# Patient Record
Sex: FEMALE | Race: WHITE | NOT HISPANIC OR LATINO | URBAN - METROPOLITAN AREA
[De-identification: names, ages, dates, MRNs, and addresses within clinical notes are randomized per-mention and may not be internally consistent; named-entity substitution may affect disease eponyms.]

---

## 2021-05-11 PROBLEM — Z00.00 ENCOUNTER FOR PREVENTIVE HEALTH EXAMINATION: Status: ACTIVE | Noted: 2021-05-11

## 2021-05-17 ENCOUNTER — EMERGENCY (EMERGENCY)
Facility: HOSPITAL | Age: 36
LOS: 1 days | Discharge: HOME | End: 2021-05-17
Attending: STUDENT IN AN ORGANIZED HEALTH CARE EDUCATION/TRAINING PROGRAM
Payer: MEDICARE

## 2021-05-17 VITALS
HEART RATE: 69 BPM | OXYGEN SATURATION: 99 % | RESPIRATION RATE: 18 BRPM | TEMPERATURE: 99 F | SYSTOLIC BLOOD PRESSURE: 126 MMHG | DIASTOLIC BLOOD PRESSURE: 82 MMHG

## 2021-05-17 VITALS
DIASTOLIC BLOOD PRESSURE: 71 MMHG | HEART RATE: 79 BPM | OXYGEN SATURATION: 98 % | RESPIRATION RATE: 16 BRPM | TEMPERATURE: 99 F | SYSTOLIC BLOOD PRESSURE: 121 MMHG

## 2021-05-17 DIAGNOSIS — F32.0 MAJOR DEPRESSIVE DISORDER, SINGLE EPISODE, MILD: ICD-10-CM

## 2021-05-17 DIAGNOSIS — F43.10 POST-TRAUMATIC STRESS DISORDER, UNSPECIFIED: ICD-10-CM

## 2021-05-17 DIAGNOSIS — F32.89 OTHER SPECIFIED DEPRESSIVE EPISODES: ICD-10-CM

## 2021-05-17 LAB — SARS-COV-2 RNA SPEC QL NAA+PROBE: SIGNIFICANT CHANGE UP

## 2021-05-17 PROCEDURE — U0003: CPT

## 2021-05-17 PROCEDURE — 99284 EMERGENCY DEPT VISIT MOD MDM: CPT

## 2021-05-17 PROCEDURE — U0005: CPT

## 2021-05-17 PROCEDURE — 99283 EMERGENCY DEPT VISIT LOW MDM: CPT

## 2021-05-17 RX ORDER — LEVOTHYROXINE SODIUM 125 MCG
1 TABLET ORAL
Qty: 30 | Refills: 0
Start: 2021-05-17 | End: 2021-06-15
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
## 2021-05-17 NOTE — ED BEHAVIORAL HEALTH ASSESSMENT NOTE - SUMMARY
37 y/o F, PPHX of PTSD and clinical depression w/PMHx hypothyroidism called EMS on herself and states she just wanted a COVID test. Pt was not compliant with interview and was very irritable with any questions. Pt had very disorganized thoughts and various tangents about social security, paperwork, her parents living in [place], and hx of mental health. When asked further questions about each she states she doesn't feel like she needs to talk about it to me. Pt states she is set up for housing. No psychiatric care indicated.

## 2021-05-17 NOTE — ED ADULT NURSE REASSESSMENT NOTE - NS ED NURSE REASSESS COMMENT FT1
Pt discharged by MD Bello. pt seen by Social work, given bus tickets and list of motels and told to go to [facility] in AM. Pt verbally abusive to staff. Security called to assist.

## 2021-05-17 NOTE — ED BEHAVIORAL HEALTH ASSESSMENT NOTE - CASE SUMMARY
37 y/o F, PPHX of PTSD and clinical depression w/PMHx hypothyroidism called EMS on herself and states she just wanted a COVID test. Pt was not compliant with interview and was very irritable with any questions. Pt had very disorganized thoughts and various tangents about social security, paperwork, her parents living in [place], and hx of mental health. When asked further questions about each she states she doesn't feel like she needs to talk about it to me. Pt states she is set up for housing. No psychiatric care indicated. 35 y/o F, PPHX of PTSD and clinical depression w/PMHx hypothyroidism called EMS on herself and states she just wanted a COVID test. Pt was not compliant with interview and was very irritable with any questions. Pt had very disorganized thoughts and various tangents about social security, paperwork, her parents living in [place], and hx of mental health. When asked further questions about each she states she doesn't feel like she needs to talk about it to me. Pt would like to be set up with Emergency Housing . No psychiatric care indicated. Pt was seen with Dr. Hawley and did not portray that she is a threat to herself or anyone else.

## 2021-05-17 NOTE — ED PROVIDER NOTE - PATIENT PORTAL LINK FT
You can access the FollowMyHealth Patient Portal offered by [facility] by registering at the following website: [email or web address]. By joining [employer]’s FollowMyHealth portal, you will also be able to view your health information using other applications (apps) compatible with our system. You can access the FollowMyHealth Patient Portal offered by [facility] by registering at the following website: [email or web address]. By joining [employer]’s FollowMyHealth portal, you will also be able to view your health information using other applications (apps) compatible with our system.

## 2021-05-17 NOTE — ED PROVIDER NOTE - OBJECTIVE STATEMENT
Pt is a 37 y/o F w/PMHx hypothyroidism presents c/o covid test.  EMS was called by public safety to local bus stop, pt was transported stating she wanted to get a covid test.  Pt has no acute concerns.

## 2021-05-17 NOTE — ED ADULT NURSE REASSESSMENT NOTE - NS ED NURSE REASSESS COMMENT FT1
Assumed patient care at 1146, charting as noted. Patient A&Ox4, denies any pain or discomfort. Respirations even & unlabored. Plan of care discussed, all questions answered.

## 2021-05-17 NOTE — ED PROVIDER NOTE - ATTENDING CONTRIBUTION TO CARE
36 YOF w/PMHx hypothyroidism presents c/o covid test.  EMS was called by [employer] safety to local bus stop. Calm and cooperative with EMS. Upon arrival here, patient verbally aggressive with staff. Denies any acute symptoms. Denies SI/HI. Denies psych history. Reports needs housing.  AP - no acute symptoms. patient now calm. will swab and get SW assistance for housing

## 2021-05-17 NOTE — ED PROVIDER NOTE - NS ED ROS FT
CONSTITUTIONAL: No fevers, no chills  Eyes: No vision changes  Cardiovascular: No Chest pain  Respiratory: No SOB  Gastrointestinal: No n/v/c/d, no abd pain  Genitourinary: no dysuria, no hematuria  SKIN: no rashes.  MSK: no weakness, no myalgias, no arthralgias  NEURO: no headache, no weakness, no numbness  PSYCHIATRIC: no SI/HI, no AV/VH, no command hallucinations
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
## 2021-05-17 NOTE — ED ADULT TRIAGE NOTE - CHIEF COMPLAINT QUOTE
As per EMS, pt was found laying on the grass.  Upon arrival pt states she needs a covid test for emergency housing.  Pt became agitated in triage, security called for safety.  unable to obtain vitals in triage.

## 2021-05-17 NOTE — ED BEHAVIORAL HEALTH ASSESSMENT NOTE - DETAILS
pt was not compliant and not receptive to conversation regarding mental health unable to assess self

## 2021-05-17 NOTE — ED BEHAVIORAL HEALTH ASSESSMENT NOTE - HPI (INCLUDE ILLNESS QUALITY, SEVERITY, DURATION, TIMING, CONTEXT, MODIFYING FACTORS, ASSOCIATED SIGNS AND SYMPTOMS)
37 y/o F, PPHX of PTSD and clinical depression w/PMHx hypothyroidism called EMS on herself and states she just wanted a COVID test. Pt was not compliant with interview and was very irritable with any questions. Pt had very disorganized thoughts and various tangents about social security, paperwork, her parents living in [place], and hx of mental health. When asked further questions about each she states she doesn't feel like she needs to talk about it to me. Pt states she is set up with housing through [facility] ([facility] Pt states she doesn't drink ETOH or use drugs and when I asked if she ever did, she told me that's not what she said and that I put words in her mouth and that was rude. Pt was then asked again, "have you ever used drugs in the past", and she said I don't need to talk about that with you, and that I should just take her blood and be done with her. Pt was educated that I was from psych and not in charge of her medical treatment and she was not pleased with that answer and states I was disrespecting her as a professional. Pt states her family currently lives in [place]. 37 y/o F, PPHX of PTSD and clinical depression w/PMHx hypothyroidism called EMS on herself and states she just wanted a COVID test. Pt was not compliant with interview and was very irritable with any questions. Pt had very disorganized thoughts and various tangents about social security, paperwork, her parents living in [place], and hx of mental health. When asked further questions about each she states she doesn't feel like she needs to talk about it to me. Pt states she is set up with housing through [facility] ([facility]). Pt states she doesn't drink ETOH or use drugs and when I asked if she ever did, she told me that's not what she said and that I put words in her mouth and that was rude. Pt was then asked again, "have you ever used drugs in the past", and she said I don't need to talk about that with you, and that I should just take her blood and be done with her. Pt was educated that I was from psych and not in charge of her medical treatment and she was not pleased with that answer and states I was disrespecting her as a professional. Pt states her family currently lives in [place]. Pt is interested in Emergency housing while her application for housing is processing/

## 2021-06-09 ENCOUNTER — EMERGENCY (EMERGENCY)
Facility: HOSPITAL | Age: 36
LOS: 1 days | Discharge: HOME | End: 2021-06-09
Attending: EMERGENCY MEDICINE
Payer: MEDICARE

## 2021-06-09 VITALS — WEIGHT: 210.1 LBS | HEIGHT: 65 IN

## 2021-06-09 DIAGNOSIS — F43.29 ADJUSTMENT DISORDER WITH OTHER SYMPTOMS: ICD-10-CM

## 2021-06-09 LAB
ALBUMIN SERPL ELPH-MCNC: 4.2 G/DL — SIGNIFICANT CHANGE UP (ref 3.3–5.2)
ALP SERPL-CCNC: 74 U/L — SIGNIFICANT CHANGE UP (ref 40–120)
ALT FLD-CCNC: 20 U/L — SIGNIFICANT CHANGE UP
ANION GAP SERPL CALC-SCNC: 12 MMOL/L — SIGNIFICANT CHANGE UP (ref 5–17)
APAP SERPL-MCNC: <3 UG/ML — LOW (ref 10–26)
AST SERPL-CCNC: 20 U/L — SIGNIFICANT CHANGE UP
BASOPHILS # BLD AUTO: 0.03 K/UL — SIGNIFICANT CHANGE UP (ref 0–0.2)
BASOPHILS NFR BLD AUTO: 0.3 % — SIGNIFICANT CHANGE UP (ref 0–2)
BILIRUB SERPL-MCNC: 0.6 MG/DL — SIGNIFICANT CHANGE UP (ref 0.4–2)
BUN SERPL-MCNC: 9.8 MG/DL — SIGNIFICANT CHANGE UP (ref 8–20)
CALCIUM SERPL-MCNC: 9.1 MG/DL — SIGNIFICANT CHANGE UP (ref 8.6–10.2)
CHLORIDE SERPL-SCNC: 105 MMOL/L — SIGNIFICANT CHANGE UP (ref 98–107)
CO2 SERPL-SCNC: 24 MMOL/L — SIGNIFICANT CHANGE UP (ref 22–29)
CREAT SERPL-MCNC: 0.7 MG/DL — SIGNIFICANT CHANGE UP (ref 0.5–1.3)
EOSINOPHIL # BLD AUTO: 0.08 K/UL — SIGNIFICANT CHANGE UP (ref 0–0.5)
EOSINOPHIL NFR BLD AUTO: 0.8 % — SIGNIFICANT CHANGE UP (ref 0–6)
ETHANOL SERPL-MCNC: <10 MG/DL — SIGNIFICANT CHANGE UP (ref 0–9)
GLUCOSE SERPL-MCNC: 101 MG/DL — HIGH (ref 70–99)
HCT VFR BLD CALC: 39.2 % — SIGNIFICANT CHANGE UP (ref 34.5–45)
HGB BLD-MCNC: 12.7 G/DL — SIGNIFICANT CHANGE UP (ref 11.5–15.5)
IMM GRANULOCYTES NFR BLD AUTO: 0.3 % — SIGNIFICANT CHANGE UP (ref 0–1.5)
LYMPHOCYTES # BLD AUTO: 1.57 K/UL — SIGNIFICANT CHANGE UP (ref 1–3.3)
LYMPHOCYTES # BLD AUTO: 16 % — SIGNIFICANT CHANGE UP (ref 13–44)
MCHC RBC-ENTMCNC: 28.1 PG — SIGNIFICANT CHANGE UP (ref 27–34)
MCHC RBC-ENTMCNC: 32.4 GM/DL — SIGNIFICANT CHANGE UP (ref 32–36)
MCV RBC AUTO: 86.7 FL — SIGNIFICANT CHANGE UP (ref 80–100)
MONOCYTES # BLD AUTO: 0.45 K/UL — SIGNIFICANT CHANGE UP (ref 0–0.9)
MONOCYTES NFR BLD AUTO: 4.6 % — SIGNIFICANT CHANGE UP (ref 2–14)
NEUTROPHILS # BLD AUTO: 7.67 K/UL — HIGH (ref 1.8–7.4)
NEUTROPHILS NFR BLD AUTO: 78 % — HIGH (ref 43–77)
PLATELET # BLD AUTO: 285 K/UL — SIGNIFICANT CHANGE UP (ref 150–400)
POTASSIUM SERPL-MCNC: 3.3 MMOL/L — LOW (ref 3.5–5.3)
POTASSIUM SERPL-SCNC: 3.3 MMOL/L — LOW (ref 3.5–5.3)
PROT SERPL-MCNC: 6.9 G/DL — SIGNIFICANT CHANGE UP (ref 6.6–8.7)
RBC # BLD: 4.52 M/UL — SIGNIFICANT CHANGE UP (ref 3.8–5.2)
RBC # FLD: 12.8 % — SIGNIFICANT CHANGE UP (ref 10.3–14.5)
SALICYLATES SERPL-MCNC: <0.6 MG/DL — LOW (ref 10–20)
SODIUM SERPL-SCNC: 141 MMOL/L — SIGNIFICANT CHANGE UP (ref 135–145)
TSH SERPL-MCNC: 2.12 UIU/ML — SIGNIFICANT CHANGE UP (ref 0.27–4.2)
WBC # BLD: 9.83 K/UL — SIGNIFICANT CHANGE UP (ref 3.8–10.5)
WBC # FLD AUTO: 9.83 K/UL — SIGNIFICANT CHANGE UP (ref 3.8–10.5)

## 2021-06-09 PROCEDURE — 93010 ELECTROCARDIOGRAM REPORT: CPT

## 2021-06-09 PROCEDURE — 99284 EMERGENCY DEPT VISIT MOD MDM: CPT

## 2021-06-09 PROCEDURE — 99218: CPT

## 2021-06-09 RX ORDER — MIDAZOLAM HYDROCHLORIDE 1 MG/ML
10 INJECTION, SOLUTION INTRAMUSCULAR; INTRAVENOUS ONCE
Refills: 0 | Status: DISCONTINUED | OUTPATIENT
Start: 2021-06-09 | End: 2021-06-09

## 2021-06-09 RX ADMIN — MIDAZOLAM HYDROCHLORIDE 10 MILLIGRAM(S): 1 INJECTION, SOLUTION INTRAMUSCULAR; INTRAVENOUS at 18:35
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
## 2021-06-09 NOTE — ED PROVIDER NOTE - PHYSICAL EXAMINATION
General:   acutely agitated, pressured speech  Head:     NC/AT, EOMI, oral mucosa moist  Neck:     trachea midline  Lungs:     CTA b/l, no w/r/r  CVS:     S1S2, RRR, no m/g/r  Abd:     +BS, s/nt/nd, no organomegaly  Ext:    2+ radial and pedal pulses, no c/c/e  Neuro: AAOx3, no sensory/motor deficits

## 2021-06-09 NOTE — CHART NOTE - NSCHARTNOTEFT_GEN_A_CORE
EVAN Note: EVAN made aware by  provider pt is T&R, needs assistance with housing. SW met with pt, pt reports she came to [place] from [place] a little over a month ago, has received a voucher for housing through [facility] however has yet to find a place, pt reports in the meantime she has been staying in a tent, or using money from SSI for hotels. Pt reports she has $20 left in bank account, lost her phone and has no means to obtain housing or resources. EVAN placed call to [facility], spoke to Winifred [phone] who reports pt was denied due to SSI income. SW explained pt is reporting she has spent all her money on hotels. Winifred reports pt can be housed at [facility] ([address] [place] [address]), however needs negative covid result prior to going. RN and med team aware pt cannot be d/c without negative covid result for placement. SW following

## 2021-06-09 NOTE — ED ADULT TRIAGE NOTE - CHIEF COMPLAINT QUOTE
pt comes to ED aggressive and agitated, accompanied by [employer]. pt brought in with no medical problems, pt with flighty affect and verbally abusive toward staff. found to have multiple loose pills in her bag, in manic state.

## 2021-06-09 NOTE — ED PROVIDER NOTE - NS ED ROS FT
Constitutional: (-) fever  (-)chills  (-)sweats  Eyes/ENT: (-)   Cardiovascular: (-) chest pain, (-) palpitations (-) edema   Respiratory: (-) cough, (-) shortness of breath   Gastrointestinal: (-)nausea  (-)vomiting, (-) diarrhea  (-) abdominal pain   :  (-)dysuria, (-)frequency, (-)urgency, (-)hematuria  Musculoskeletal: (-) neck pain, (-) back pain, (-) joint pain  Integumentary: (-) rash, (-) edema  Neurological: (-) headache, (-) altered mental status  (-)LOC  Psych: +agitation
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
## 2021-06-09 NOTE — ED BEHAVIORAL HEALTH ASSESSMENT NOTE - SUMMARY
Patient is a 36 year old female who is currently unemployed, homeless, living in a tent and hotels, with a self reported history of depression and PTSD who was BIB [employer] for agitation after being found stealing 2 bottles of water from a dollar store.     Patient seen and evaluated and found to be calm and cooperative expressing remorse for her actions and denies any s/h ideation with no signs of joseph or psychosis. Patient does request to speak to SW to  help for her housing situation. Patient to be cleared psychiatrically and to be seen by SW for housing options

## 2021-06-09 NOTE — ED PROVIDER NOTE - CLINICAL SUMMARY MEDICAL DECISION MAKING FREE TEXT BOX
patient with acute agitation, given treatment for agitation, medical clearance labs, signed out pending labs for psych eval.

## 2021-06-09 NOTE — ED BEHAVIORAL HEALTH ASSESSMENT NOTE - DESCRIPTION
Hashimoto's single, has 2 children that were taken out of her custody. patient is homeless and lives in tent and sometimes  hotel rooms Vital Signs Last 24 Hrs  T(C): 36.9 (09 Jun 2021 18:41), Max: 36.9 (09 Jun 2021 18:41)  T(F): 98.5 (09 Jun 2021 18:41), Max: 98.5 (09 Jun 2021 18:41)  HR: 95 (09 Jun 2021 18:41) (95 - 95)  BP: 141/85 (09 Jun 2021 18:41) (141/85 - 141/85)  BP(mean): --  RR: 20 (09 Jun 2021 18:41) (20 - 20)  SpO2: 98% (09 Jun 2021 18:41) (98% - 98%)

## 2021-06-09 NOTE — ED PROVIDER NOTE - OBJECTIVE STATEMENT
36yoF; with pmh signif for Depression and Hashimoto's Thyroiditis; now p/w acute agitation. patient was found taking items from a store without paying and when police called, they stated patient with very disorganized pressured speech.  patient denies any si/hi/vh/ah.  speaking with extremely pressured speech, tangential speech, disorganized thinking. denies cp/sob/palp. denies f/c/s. denies abd pain. denies n/v/d. denies headache. denies trauma.  PMH: Depression, Hashimoto's Thyroiditis  SOCIAL: No tobacco/illicit substance use/socialEtOH

## 2021-06-10 ENCOUNTER — EMERGENCY (EMERGENCY)
Facility: HOSPITAL | Age: 36
LOS: 1 days | Discharge: HOME | End: 2021-06-10
Attending: EMERGENCY MEDICINE
Payer: MEDICARE

## 2021-06-10 VITALS
SYSTOLIC BLOOD PRESSURE: 119 MMHG | HEART RATE: 78 BPM | DIASTOLIC BLOOD PRESSURE: 80 MMHG | OXYGEN SATURATION: 98 % | HEIGHT: 65 IN | RESPIRATION RATE: 16 BRPM | WEIGHT: 169.98 LBS | TEMPERATURE: 98 F

## 2021-06-10 VITALS
HEART RATE: 68 BPM | DIASTOLIC BLOOD PRESSURE: 77 MMHG | TEMPERATURE: 98 F | OXYGEN SATURATION: 99 % | SYSTOLIC BLOOD PRESSURE: 120 MMHG | RESPIRATION RATE: 18 BRPM

## 2021-06-10 PROBLEM — Z78.9 OTHER SPECIFIED HEALTH STATUS: Chronic | Status: ACTIVE | Noted: 2021-05-17

## 2021-06-10 LAB — SARS-COV-2 RNA SPEC QL NAA+PROBE: SIGNIFICANT CHANGE UP

## 2021-06-10 PROCEDURE — 84443 ASSAY THYROID STIM HORMONE: CPT

## 2021-06-10 PROCEDURE — G0378: CPT

## 2021-06-10 PROCEDURE — 99282 EMERGENCY DEPT VISIT SF MDM: CPT

## 2021-06-10 PROCEDURE — 99283 EMERGENCY DEPT VISIT LOW MDM: CPT

## 2021-06-10 PROCEDURE — 80053 COMPREHEN METABOLIC PANEL: CPT

## 2021-06-10 PROCEDURE — U0005: CPT

## 2021-06-10 PROCEDURE — 99285 EMERGENCY DEPT VISIT HI MDM: CPT | Mod: 25

## 2021-06-10 PROCEDURE — 80307 DRUG TEST PRSMV CHEM ANLYZR: CPT

## 2021-06-10 PROCEDURE — 36415 COLL VENOUS BLD VENIPUNCTURE: CPT

## 2021-06-10 PROCEDURE — 93005 ELECTROCARDIOGRAM TRACING: CPT

## 2021-06-10 PROCEDURE — 85025 COMPLETE CBC W/AUTO DIFF WBC: CPT

## 2021-06-10 PROCEDURE — U0003: CPT

## 2021-06-10 PROCEDURE — 96372 THER/PROPH/DIAG INJ SC/IM: CPT

## 2021-06-10 RX ORDER — DIPHENHYDRAMINE HCL 50 MG
50 CAPSULE ORAL ONCE
Refills: 0 | Status: COMPLETED | OUTPATIENT
Start: 2021-06-10 | End: 2021-06-10

## 2021-06-10 RX ORDER — HALOPERIDOL DECANOATE 100 MG/ML
5 INJECTION INTRAMUSCULAR ONCE
Refills: 0 | Status: COMPLETED | OUTPATIENT
Start: 2021-06-10 | End: 2021-06-10

## 2021-06-10 RX ADMIN — MIDAZOLAM HYDROCHLORIDE 10 MILLIGRAM(S): 1 INJECTION, SOLUTION INTRAMUSCULAR; INTRAVENOUS at 00:11

## 2021-06-10 RX ADMIN — HALOPERIDOL DECANOATE 5 MILLIGRAM(S): 100 INJECTION INTRAMUSCULAR at 00:43

## 2021-06-10 RX ADMIN — Medication 50 MILLIGRAM(S): at 00:43

## 2021-06-10 NOTE — ED PROVIDER NOTE - PATIENT PORTAL LINK FT
You can access the FollowMyHealth Patient Portal offered by [facility] by registering at the following website: [email or web address]. By joining [employer]’s FollowMyHealth portal, you will also be able to view your health information using other applications (apps) compatible with our system.

## 2021-06-10 NOTE — ED CDU PROVIDER DISPOSITION NOTE - PATIENT PORTAL LINK FT
You can access the FollowMyHealth Patient Portal offered by [facility] by registering at the following website: [email or web address]. By joining [employer]’s FollowMyHealth portal, you will also be able to view your health information using other applications (apps) compatible with our system.

## 2021-06-10 NOTE — CHART NOTE - NSCHARTNOTEFT_GEN_A_CORE
Pt requiring housing. Worker spoke with Katty at [facility]. Pt was placed at [facility] in [address].  Pt accepting placement and stating her mother will provide transport. MD updated. No further social work needs at this time.

## 2021-06-10 NOTE — ED PROVIDER NOTE - CLINICAL SUMMARY MEDICAL DECISION MAKING FREE TEXT BOX
PT with stable VS, no acute distress, non toxic appearing, tolerating PO in the ED,  SW contacted housing arranged housing, Pt with no acute medical complaint given numbers for emergency housing to [place] provided hospital phone to place call, educated about when to return to the ED if needed. PT verbalizes that he understands all instructions and results. Pt informed that ED is open and available 24/7 365 days a yr, encouraged to return to the ED if they have any change in condition, or feel the need for revaluation.
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
## 2021-06-10 NOTE — ED PROVIDER NOTE - OBJECTIVE STATEMENT
PT with SPMHX of homelessness, presents to the ED with complaint of of needing housing. Pt states that he has no medical complaint at this time is feeling well. Pt states that he has no place to live and would like to obtain emergency housing. Pt dines fever, chills, weakness, numbness, tingling, loss of sensation, HA, dizziness, SOB, CP, SI, HI, hearing or seeing things.

## 2021-06-10 NOTE — ED PROVIDER NOTE - NSFOLLOWUPINSTRUCTIONS_ED_ALL_ED_FT
Emergency Sheltering:  If you are homeless and in need of shelter - Please call the [facility] Department of  8am-4pm  - Monday to Friday – Please call the [facility] Temporary Housing Assistance Unit at, [phone].  After  4:30pm, Weekends & Holidays - Please call the [facility] Emergency Services Unit at, [phone].

## 2021-06-10 NOTE — ED ADULT TRIAGE NOTE - CHIEF COMPLAINT QUOTE
"Im homeless and Im was here this morning. they were going to help me with that but I had to leave for a job interview. I am back now looking for housing again."

## 2021-06-10 NOTE — ED ADULT NURSE REASSESSMENT NOTE - GENERAL PATIENT STATE
anxious
resting/sleeping
comfortable appearance/cooperative/resting/sleeping
Within functional limits
Decreased mastication ability

## 2021-08-03 ENCOUNTER — EMERGENCY (EMERGENCY)
Facility: HOSPITAL | Age: 36
LOS: 1 days | Discharge: HOME | End: 2021-08-03
Attending: EMERGENCY MEDICINE
Payer: MEDICARE

## 2021-08-03 VITALS
HEART RATE: 84 BPM | DIASTOLIC BLOOD PRESSURE: 79 MMHG | SYSTOLIC BLOOD PRESSURE: 133 MMHG | TEMPERATURE: 99 F | OXYGEN SATURATION: 97 % | RESPIRATION RATE: 18 BRPM

## 2021-08-03 VITALS
RESPIRATION RATE: 20 BRPM | HEART RATE: 92 BPM | DIASTOLIC BLOOD PRESSURE: 88 MMHG | WEIGHT: 167.99 LBS | SYSTOLIC BLOOD PRESSURE: 142 MMHG | HEIGHT: 65 IN | OXYGEN SATURATION: 97 % | TEMPERATURE: 98 F

## 2021-08-03 LAB
ALBUMIN SERPL ELPH-MCNC: 4.3 G/DL — SIGNIFICANT CHANGE UP (ref 3.3–5.2)
ALP SERPL-CCNC: 68 U/L — SIGNIFICANT CHANGE UP (ref 40–120)
ALT FLD-CCNC: 19 U/L — SIGNIFICANT CHANGE UP
ANION GAP SERPL CALC-SCNC: 12 MMOL/L — SIGNIFICANT CHANGE UP (ref 5–17)
APAP SERPL-MCNC: <3 UG/ML — LOW (ref 10–26)
APPEARANCE UR: ABNORMAL
AST SERPL-CCNC: 20 U/L — SIGNIFICANT CHANGE UP
BACTERIA # UR AUTO: ABNORMAL
BASOPHILS # BLD AUTO: 0.03 K/UL — SIGNIFICANT CHANGE UP (ref 0–0.2)
BASOPHILS NFR BLD AUTO: 0.4 % — SIGNIFICANT CHANGE UP (ref 0–2)
BILIRUB SERPL-MCNC: 0.4 MG/DL — SIGNIFICANT CHANGE UP (ref 0.4–2)
BILIRUB UR-MCNC: NEGATIVE — SIGNIFICANT CHANGE UP
BUN SERPL-MCNC: 11.5 MG/DL — SIGNIFICANT CHANGE UP (ref 8–20)
CALCIUM SERPL-MCNC: 9.2 MG/DL — SIGNIFICANT CHANGE UP (ref 8.6–10.2)
CHLORIDE SERPL-SCNC: 101 MMOL/L — SIGNIFICANT CHANGE UP (ref 98–107)
CO2 SERPL-SCNC: 24 MMOL/L — SIGNIFICANT CHANGE UP (ref 22–29)
COLOR SPEC: YELLOW — SIGNIFICANT CHANGE UP
CREAT SERPL-MCNC: 0.52 MG/DL — SIGNIFICANT CHANGE UP (ref 0.5–1.3)
DIFF PNL FLD: ABNORMAL
EOSINOPHIL # BLD AUTO: 0.06 K/UL — SIGNIFICANT CHANGE UP (ref 0–0.5)
EOSINOPHIL NFR BLD AUTO: 0.7 % — SIGNIFICANT CHANGE UP (ref 0–6)
EPI CELLS # UR: ABNORMAL
ETHANOL SERPL-MCNC: <10 MG/DL — SIGNIFICANT CHANGE UP (ref 0–9)
GLUCOSE SERPL-MCNC: 95 MG/DL — SIGNIFICANT CHANGE UP (ref 70–99)
GLUCOSE UR QL: NEGATIVE MG/DL — SIGNIFICANT CHANGE UP
HCG SERPL-ACNC: <4 MIU/ML — SIGNIFICANT CHANGE UP
HCT VFR BLD CALC: 42.8 % — SIGNIFICANT CHANGE UP (ref 34.5–45)
HGB BLD-MCNC: 13.6 G/DL — SIGNIFICANT CHANGE UP (ref 11.5–15.5)
IMM GRANULOCYTES NFR BLD AUTO: 0.4 % — SIGNIFICANT CHANGE UP (ref 0–1.5)
KETONES UR-MCNC: ABNORMAL
LEUKOCYTE ESTERASE UR-ACNC: ABNORMAL
LYMPHOCYTES # BLD AUTO: 1.3 K/UL — SIGNIFICANT CHANGE UP (ref 1–3.3)
LYMPHOCYTES # BLD AUTO: 16 % — SIGNIFICANT CHANGE UP (ref 13–44)
MCHC RBC-ENTMCNC: 27.7 PG — SIGNIFICANT CHANGE UP (ref 27–34)
MCHC RBC-ENTMCNC: 31.8 GM/DL — LOW (ref 32–36)
MCV RBC AUTO: 87.2 FL — SIGNIFICANT CHANGE UP (ref 80–100)
MONOCYTES # BLD AUTO: 0.36 K/UL — SIGNIFICANT CHANGE UP (ref 0–0.9)
MONOCYTES NFR BLD AUTO: 4.4 % — SIGNIFICANT CHANGE UP (ref 2–14)
NEUTROPHILS # BLD AUTO: 6.36 K/UL — SIGNIFICANT CHANGE UP (ref 1.8–7.4)
NEUTROPHILS NFR BLD AUTO: 78.1 % — HIGH (ref 43–77)
NITRITE UR-MCNC: NEGATIVE — SIGNIFICANT CHANGE UP
PH UR: 6 — SIGNIFICANT CHANGE UP (ref 5–8)
PLATELET # BLD AUTO: 340 K/UL — SIGNIFICANT CHANGE UP (ref 150–400)
POTASSIUM SERPL-MCNC: 4.1 MMOL/L — SIGNIFICANT CHANGE UP (ref 3.5–5.3)
POTASSIUM SERPL-SCNC: 4.1 MMOL/L — SIGNIFICANT CHANGE UP (ref 3.5–5.3)
PROT SERPL-MCNC: 7.1 G/DL — SIGNIFICANT CHANGE UP (ref 6.6–8.7)
PROT UR-MCNC: 15 MG/DL
RBC # BLD: 4.91 M/UL — SIGNIFICANT CHANGE UP (ref 3.8–5.2)
RBC # FLD: 12.8 % — SIGNIFICANT CHANGE UP (ref 10.3–14.5)
RBC CASTS # UR COMP ASSIST: ABNORMAL /HPF (ref 0–4)
SALICYLATES SERPL-MCNC: <0.6 MG/DL — LOW (ref 10–20)
SARS-COV-2 RNA SPEC QL NAA+PROBE: SIGNIFICANT CHANGE UP
SODIUM SERPL-SCNC: 137 MMOL/L — SIGNIFICANT CHANGE UP (ref 135–145)
SP GR SPEC: 1.02 — SIGNIFICANT CHANGE UP (ref 1.01–1.02)
UROBILINOGEN FLD QL: NEGATIVE MG/DL — SIGNIFICANT CHANGE UP
WBC # BLD: 8.14 K/UL — SIGNIFICANT CHANGE UP (ref 3.8–10.5)
WBC # FLD AUTO: 8.14 K/UL — SIGNIFICANT CHANGE UP (ref 3.8–10.5)
WBC UR QL: SIGNIFICANT CHANGE UP

## 2021-08-03 PROCEDURE — U0003: CPT

## 2021-08-03 PROCEDURE — 81001 URINALYSIS AUTO W/SCOPE: CPT

## 2021-08-03 PROCEDURE — 99284 EMERGENCY DEPT VISIT MOD MDM: CPT

## 2021-08-03 PROCEDURE — 84702 CHORIONIC GONADOTROPIN TEST: CPT

## 2021-08-03 PROCEDURE — 93010 ELECTROCARDIOGRAM REPORT: CPT

## 2021-08-03 PROCEDURE — 86769 SARS-COV-2 COVID-19 ANTIBODY: CPT

## 2021-08-03 PROCEDURE — 85025 COMPLETE CBC W/AUTO DIFF WBC: CPT

## 2021-08-03 PROCEDURE — 80307 DRUG TEST PRSMV CHEM ANLYZR: CPT

## 2021-08-03 PROCEDURE — U0005: CPT

## 2021-08-03 PROCEDURE — 80053 COMPREHEN METABOLIC PANEL: CPT

## 2021-08-03 PROCEDURE — 93005 ELECTROCARDIOGRAM TRACING: CPT

## 2021-08-03 PROCEDURE — 90792 PSYCH DIAG EVAL W/MED SRVCS: CPT

## 2021-08-03 PROCEDURE — 99285 EMERGENCY DEPT VISIT HI MDM: CPT

## 2021-08-03 PROCEDURE — 36415 COLL VENOUS BLD VENIPUNCTURE: CPT

## 2021-08-03 RX ORDER — DIPHENHYDRAMINE HCL 50 MG
50 CAPSULE ORAL ONCE
Refills: 0 | Status: DISCONTINUED | OUTPATIENT
Start: 2021-08-03 | End: 2021-08-03

## 2021-08-03 NOTE — ED PROVIDER NOTE - RATE
59 Bobby Crum meeting scheduled for today at [facility] attended by patient, PT, SW and patient's brother via phone  Per therapy, patient is doing very well  Patient is able to ambulate over 400 feet and is independent with step management  Patient is able to ambulate with cane  Patient was able to complete 22 steps  Patient is being recommended for outpatient therapy  Patient's brother had questions if patient remains on steroids  Patient stated he would like to switch back to Lyrica instead of gabapentin  Patient states that he feels drowsy on Gabapentin  SW explained to brother Natalie Lemus, that SW will speak to MD and DON about medication questions  Patient's anticipated date of dc is Wednesday  Brother Natalie Lemus can transport at [place] on Wednesday  Patient is being recommended for outpatient therapy  Patient will provide [employer] number to patient and place on dc instructions to assist patient with making outpatient MD appointments  Patient confirmed that his PCP is [facility]  Sw will follow  88
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
## 2021-08-03 NOTE — ED BEHAVIORAL HEALTH ASSESSMENT NOTE - NSSUICPROTFACT_PSY_ALL_CORE
Responsibility to children, family, or others/Identifies reasons for living
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
## 2021-08-03 NOTE — ED PROVIDER NOTE - PHYSICAL EXAMINATION
VITAL SIGNS: I have reviewed nursing notes and confirm.  CONSTITUTIONAL:  in no acute distress.  SKIN: Skin exam is warm and dry, no acute rash.  HEAD: Normocephalic; atraumatic.  EYES: PERRL, EOM intact; conjunctiva and sclera clear.  ENT: No nasal discharge; airway clear. Throat clear.  NECK: Supple; non tender.    CARD: Regular rate and rhythm.  RESP: No wheezes,  no rales or rhonchi.   ABD:  soft; non-distended; non-tender;   EXT: Normal ROM. No clubbing, cyanosis or edema.  NEURO: Alert, oriented. Grossly unremarkable. No focal deficits.  moves all extremities,  normal gait   PSYCH: (+) in appropriate.

## 2021-08-03 NOTE — ED BEHAVIORAL HEALTH ASSESSMENT NOTE - INVOLUNTARY INTRAMUSCULAR MEDICATION DETAILS
patient given versed on initial presentation to ED and seen later by writer and found to be awake alert and oriented x3.
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
## 2021-08-03 NOTE — ED BEHAVIORAL HEALTH ASSESSMENT NOTE - DESCRIPTION
Hashimoto's single, has 2 children that were taken out of her custody. patient is homeless and lives with friends and sometimes  hotel rooms Vital Signs Last 24 Hrs  T(C): 36.9 (09 Jun 2021 18:41), Max: 36.9 (09 Jun 2021 18:41)  T(F): 98.5 (09 Jun 2021 18:41), Max: 98.5 (09 Jun 2021 18:41)  HR: 95 (09 Jun 2021 18:41) (95 - 95)  BP: 141/85 (09 Jun 2021 18:41) (141/85 - 141/85)  BP(mean): --  RR: 20 (09 Jun 2021 18:41) (20 - 20)  SpO2: 98% (09 Jun 2021 18:41) (98% - 98%)

## 2021-08-03 NOTE — ED PROVIDER NOTE - PATIENT PORTAL LINK FT
You can access the FollowMyHealth Patient Portal offered by [facility] by registering at the following website: [email or web address]. By joining [employer]’s FollowMyHealth portal, you will also be able to view your health information using other applications (apps) compatible with our system.

## 2021-08-03 NOTE — ED PROVIDER NOTE - NS ED ROS FT
Review of Systems  •	CONSTITUTIONAL - no  fever, no diaphoresis, no weight change  •	SKIN - no rash  •	EYES - no eye pain, no blurred vision  •	RESPIRATORY - no shortness of breath, no cough  •	CARDIAC - no chest pain, no palpitations  •	GI - no abd pain, no nausea, no vomiting, no diarrhea, no constipation, no bleeding  •	GENITO-URINARY - no discharge, no dysuria; no hematuria,   •	ENDO - no polydipsia, no polyuria, no heat/no cold intolerance  •	MUSCULOSKELETAL - no joint pain, no swelling, no redness  •	NEUROLOGIC - no weakness, no headache, no anesthesia, no paresthesias  •	PSYCH - no anxiety, non suicidal, non homicidal, no hallucination, no depression

## 2021-08-04 LAB
COVID-19 SPIKE DOMAIN AB INTERP: POSITIVE
COVID-19 SPIKE DOMAIN ANTIBODY RESULT: >250 U/ML — HIGH
SARS-COV-2 IGG+IGM SERPL QL IA: >250 U/ML — HIGH
SARS-COV-2 IGG+IGM SERPL QL IA: POSITIVE

## 2021-09-07 NOTE — ED ADULT NURSE NOTE - SKIN TURGOR
Problem: OXYGENATION/RESPIRATORY FUNCTION  Goal: Patient will maintain patent airway  9/7/2021 1132 by Blaire Elliott RN  Outcome: Ongoing  9/7/2021 0916 by Keely Cochran RCP  Outcome: Ongoing  9/7/2021 0035 by Charles Quintana RN  Outcome: Ongoing  Goal: Patient will achieve/maintain normal respiratory rate/effort  Description: Respiratory rate and effort will be within normal limits for the patient  9/7/2021 1132 by Blaire Elliott RN  Outcome: Ongoing  9/7/2021 0916 by Keely Cochran RCP  Outcome: Ongoing  9/7/2021 0035 by Charles Quintana RN  Outcome: Ongoing     Problem: MECHANICAL VENTILATION  Goal: Patient will maintain patent airway  9/7/2021 1132 by Blaire Elliott RN  Outcome: Ongoing  9/7/2021 0916 by Keely Cochran RCP  Outcome: Ongoing  9/7/2021 0035 by Charles Quintana RN  Outcome: Ongoing  Goal: Oral health is maintained or improved  9/7/2021 1132 by Blaire Elliott RN  Outcome: Ongoing  9/7/2021 0916 by AMMY ChenP  Outcome: Ongoing  9/7/2021 0035 by Charles Quintana RN  Outcome: Ongoing  Goal: Ability to express needs and understand communication  9/7/2021 1132 by Blaire Elliott RN  Outcome: Ongoing  9/7/2021 0916 by Keely Cochran RCP  Outcome: Ongoing  9/7/2021 0035 by Charles Quintana RN  Outcome: Ongoing  Goal: Mobility/activity is maintained at optimum level for patient  9/7/2021 1132 by Blaire Elliott RN  Outcome: Ongoing  9/7/2021 0916 by AMMY ChenP  Outcome: Ongoing  9/7/2021 0035 by Charles Quintana RN  Outcome: Ongoing  Goal: Tracheostomy will be managed safely  9/7/2021 1132 by Blaire Elliott RN  Outcome: Ongoing  9/7/2021 0916 by Keely Cochran RCP  Outcome: Ongoing  9/7/2021 0035 by Charles Quintana RN  Outcome: Ongoing     Problem: SKIN INTEGRITY  Goal: Skin integrity is maintained or improved  9/7/2021 1132 by Blaire Elliott RN  Outcome: Ongoing  9/7/2021 0916 by Keely Cochran RCP  Outcome: Ongoing  9/7/2021 0035 by Fabio Spurling, RN  Outcome: Ongoing     Problem: Confusion - Acute:  Goal: Absence of continued neurological deterioration signs and symptoms  Description: Absence of continued neurological deterioration signs and symptoms  9/7/2021 1132 by Marin Franks RN  Outcome: Ongoing  9/7/2021 0035 by Fabio Spurling, RN  Outcome: Ongoing  Goal: Mental status will be restored to baseline  Description: Mental status will be restored to baseline  9/7/2021 1132 by Marin Franks RN  Outcome: Ongoing  9/7/2021 0035 by Fabio Spurling, RN  Outcome: Ongoing     Problem: Discharge Planning:  Goal: Ability to perform activities of daily living will improve  Description: Ability to perform activities of daily living will improve  9/7/2021 1132 by Marin Franks RN  Outcome: Ongoing  9/7/2021 0035 by Fabio Spurling, RN  Outcome: Ongoing  Goal: Participates in care planning  Description: Participates in care planning  9/7/2021 1132 by Marin Franks RN  Outcome: Ongoing  9/7/2021 0035 by Fabio Spurling, RN  Outcome: Ongoing     Problem: Injury - Risk of, Physical Injury:  Goal: Absence of physical injury  Description: Absence of physical injury  9/7/2021 1132 by Marin Franks RN  Outcome: Ongoing  9/7/2021 0035 by Fabio Spurling, RN  Outcome: Ongoing  Goal: Will remain free from falls  Description: Will remain free from falls  9/7/2021 1132 by Marin Franks RN  Outcome: Ongoing  9/7/2021 0035 by Fabio Spurling, RN  Outcome: Ongoing     Problem: Mood - Altered:  Goal: Mood stable  Description: Mood stable  9/7/2021 1132 by Marin Franks RN  Outcome: Ongoing  9/7/2021 0035 by Fabio Spurling, RN  Outcome: Ongoing  Goal: Absence of abusive behavior  Description: Absence of abusive behavior  9/7/2021 1132 by Marin Franks RN  Outcome: Ongoing  9/7/2021 0035 by Fabio Spurling, RN  Outcome: Ongoing  Goal: Verbalizations of feeling emotionally comfortable while being cared for will increase  Description: Verbalizations of feeling emotionally comfortable while being cared for will increase  9/7/2021 1132 by Sherry Thornton RN  Outcome: Ongoing  9/7/2021 0035 by Amari العراقي RN  Outcome: Ongoing     Problem: Psychomotor Activity - Altered:  Goal: Absence of psychomotor disturbance signs and symptoms  Description: Absence of psychomotor disturbance signs and symptoms  9/7/2021 1132 by Sherry Thornton RN  Outcome: Ongoing  9/7/2021 0035 by Amari العراقي RN  Outcome: Ongoing     Problem: Sensory Perception - Impaired:  Goal: Demonstrations of improved sensory functioning will increase  Description: Demonstrations of improved sensory functioning will increase  9/7/2021 1132 by Sherry Thornton RN  Outcome: Ongoing  9/7/2021 0035 by Amari العراقي RN  Outcome: Ongoing  Goal: Decrease in sensory misperception frequency  Description: Decrease in sensory misperception frequency  9/7/2021 1132 by Sherry Thornton RN  Outcome: Ongoing  9/7/2021 0035 by Amari العراقي RN  Outcome: Ongoing  Goal: Able to refrain from responding to false sensory perceptions  Description: Able to refrain from responding to false sensory perceptions  9/7/2021 1132 by Sherry Thornton RN  Outcome: Ongoing  9/7/2021 0035 by Amari العراقي RN  Outcome: Ongoing  Goal: Demonstrates accurate environmental perceptions  Description: Demonstrates accurate environmental perceptions  9/7/2021 1132 by Sherry Thornton RN  Outcome: Ongoing  9/7/2021 0035 by Amari العراقي RN  Outcome: Ongoing  Goal: Able to distinguish between reality-based and nonreality-based thinking  Description: Able to distinguish between reality-based and nonreality-based thinking  9/7/2021 1132 by Sherry Thornton RN  Outcome: Ongoing  9/7/2021 0035 by Amari العراقي RN  Outcome: Ongoing  Goal: Able to interrupt nonreality-based thinking  Description: Able to interrupt nonreality-based thinking  9/7/2021 1132 by Sherry Thornton RN  Outcome: Ongoing  9/7/2021 0035 by Sharee Carmen RN  Outcome: Ongoing     Problem: Sleep Pattern Disturbance:  Goal: Appears well-rested  Description: Appears well-rested  9/7/2021 1132 by Ney Coley RN  Outcome: Ongoing  9/7/2021 0035 by Sharee Carmen RN  Outcome: Ongoing     Problem: Skin Integrity:  Goal: Will show no infection signs and symptoms  Description: Will show no infection signs and symptoms  9/7/2021 1132 by Ney Coley RN  Outcome: Ongoing  9/7/2021 0035 by Sharee Carmen RN  Outcome: Ongoing  Goal: Absence of new skin breakdown  Description: Absence of new skin breakdown  9/7/2021 1132 by Ney Coley RN  Outcome: Ongoing  9/7/2021 0035 by Sharee Carmen RN  Outcome: Ongoing     Problem: Falls - Risk of:  Goal: Absence of physical injury  Description: Absence of physical injury  9/7/2021 1132 by Ney Coley RN  Outcome: Ongoing  9/7/2021 0035 by Sharee Carmen RN  Outcome: Ongoing  Goal: Will remain free from falls  Description: Will remain free from falls  9/7/2021 1132 by Ney Coley RN  Outcome: Ongoing  9/7/2021 0035 by Sharee Carmen RN  Outcome: Ongoing     Problem: Non-Violent Restraints  Goal: Removal from restraints as soon as assessed to be safe  9/7/2021 1132 by Ney Coley RN  Outcome: Ongoing  9/7/2021 0035 by Sharee Carmen RN  Outcome: Ongoing  Goal: No harm/injury to patient while restraints in use  9/7/2021 1132 by Ney Coley RN  Outcome: Ongoing  9/7/2021 0035 by Sharee Carmen RN  Outcome: Ongoing  Goal: Patient's dignity will be maintained  9/7/2021 1132 by Ney Coley RN  Outcome: Ongoing  9/7/2021 0035 by Sharee Carmen RN  Outcome: Ongoing     Problem: Nutrition  Goal: Optimal nutrition therapy  9/7/2021 1132 by Ney Coley RN  Outcome: Ongoing  9/7/2021 0035 by Sharee Carmen RN  Outcome: Ongoing resilient/elastic

## 2021-10-27 ENCOUNTER — APPOINTMENT (OUTPATIENT)
Dept: FAMILY MEDICINE | Facility: CLINIC | Age: 36
End: 2021-10-27

## 2022-05-11 ENCOUNTER — APPOINTMENT (OUTPATIENT)
Dept: OPHTHALMOLOGY | Facility: CLINIC | Age: 37
End: 2022-05-11

## 2023-07-04 NOTE — ED BEHAVIORAL HEALTH ASSESSMENT NOTE - NS ED BHA HOMICIDALITY PRESENT AGGRESSION PROPERTY LIFETIME
Restorative Technician Note      Patient Name: Chris Duke  Patient Active Problem List   Diagnosis   • Ulcerative colitis without complications ([address])   • Snoring   • Severe obesity (BMI 35.0-39. 9) with comorbidity ([address])   • Nocturia   • Macular degeneration   • Impaired fasting glucose   • Primary hypertension   • Gout   • Benign neoplasm of large intestine   • Nephrolithiasis   • Low HDL (under 40)   • Stage 4 chronic kidney disease (HCC)   • Hypertriglyceridemia   • Proteinuria   • Weakness of both upper extremities   • Fall     Note Type: Bracing, Initial consult  Patient Position Upon Consult: Supine  Brace Applied: [address] (Neda shower collar given)  Additional Brace Ordered: No  Patient Position When Brace Applied: Supine  Education Provided: Yes (Gave pt [employer] instructions sheet and reviewed it with pt, pt states he has no further questions at this time.)  Patient Position at End of Consult: Supine  Nurse Communication: Nurse aware of consult, application of brace    Blane Christensen BS, Restorative Technician, [employer]
None known

## 2023-12-27 NOTE — ED PROVIDER NOTE - PROGRESS NOTE DETAILS
Detail Level: Simple patient seen by psych, treat and release. patient has an appointment with her psychiatrist. pending blood work result. blood work unremarkable.

## 2024-01-10 NOTE — ED BEHAVIORAL HEALTH ASSESSMENT NOTE - [facility]
NEUROLOGY CONSULT NOTE   Date of evaluation: 1/10/2024    Fariha Roberts ( -1948,MRN-[id number] )is here with chief complaint of stroke management     Patient Accompanied by:spouse    Patient Care Team:  Allan Cruz MD as PCP - General (Internal Medicine)  Jody Guerra RN as Diabetes Educator (Registered Nurse)    PCP:Allan Cruz MD     Past Medical History:   Diagnosis Date    Cataracts, bilateral 2017    Depression 2017    GERD (gastroesophageal reflux disease) 2017    History of breast cancer 2021    S/p lumpectomy, XRT in 40's    Hyperlipidemia 2017    Obesity 2017    Osteopenia 2017    Sciatica of right side 10/2/2018    Type 2 diabetes mellitus (CMD) 2017         History of Present Illness  Fariha Roberts is a 75 year old right handed female who presents to the neurology clinic for management of left thalamic stroke whose etiology is most consistent small vessel disease.       Per notes:     The patient was in her normal state health until 10am on 10/26/23 with acute onset of right sided leg weakness.  noted patient was dragging her R leg behind her. Patient also with associated unsteadiness and dizziness. Denies HA, vision changes, unilateral numbness/tingling, or weakness in UE. VSS, labs remarkable for CO 33, glucose 152, PTT 41. CTA H/N demonstrating no significant stenosis or occlusion within neck or intracranially, atherosclerosis at proximal ICA bilaterally with short segment proximal L ICA 39% stenosis; CTH with no acute abnormalities. Given ASA 325mg x1,Plavix 300mg x1. Notably, patient recently admitted for fall with alcohol intox and hyperglycemia, MRI lumbar spine at the time showed moderate degenerative disease and some disc bulging at L4-L5, without acute abnormality.     The patient states she has improved strength involving the right leg and is working with a .          ALLERGIES:  Patient has no known allergies.    Current Outpatient Medications   Medication Sig Dispense Refill    insulin glargine 100 UNIT/ML pen-injector Inject 16 Units into the skin nightly. Prime 2 units before each dose. 15 mL 0    Insulin Pen Needle 32G X 4 MM Misc Use to inject insulin 1 times daily. Remove needle cover(s) to expose needle before injecting. 100 each 3    atorvastatin (LIPITOR) 80 MG tablet Take 1 tablet by mouth daily. 90 tablet 3    glipiZIDE (GLUCOTROL XL) 5 MG 24 hr tablet Take 1 tablet by mouth in the morning and 1 tablet in the evening. 180 tablet 1    blood glucose test strip Test blood sugar 3 times daily. Diagnosis: E11.9 on insulin. Meter: One touch verio 100 strip 3    folic acid (FOLATE) 1 MG tablet Take 1 tablet by mouth daily. 30 tablet 3    aspirin (ECOTRIN) 81 MG EC tablet Take 1 tablet by mouth daily. 30 tablet 3    losartan (COZAAR) 25 MG tablet TAKE 1 TABLET BY MOUTH DAILY 90 tablet 1    CARBOXYMethylcellulose (REFRESH TEARS) 0.5 % ophthalmic solution Place 1 drop into both eyes in the morning and 1 drop at noon and 1 drop in the evening.      sertraline (ZOLOFT) 50 MG tablet Take 1 tablet by mouth daily. 90 tablet 0    metFORMIN (GLUCOPHAGE) 500 MG tablet Take 1 tablet by mouth in the morning and 1 tablet in the evening. Take with meals. 60 tablet 1    thiamine (VITAMIN B1) 100 MG tablet Take 1 tablet by mouth daily. Do not start before October 10, 2023. 30 tablet 0     No current facility-administered medications for this visit.       Review of systems  13 point review of systems was obtained and unremarkable, except as noted in the history of present illness..    Past Medical History     Depression                                      5/23/2017     Sciatica of right side                          10/2/2018     Cataracts, bilateral                            5/23/2017     Obesity                                         5/23/2017     GERD (gastroesophageal reflux  disease)          5/23/2017     Osteopenia                                      5/23/2017     Type 2 diabetes mellitus (CMD)                  5/23/2017     Hyperlipidemia                                  5/23/2017     History of breast cancer                        9/16/2021       Comment: S/p lumpectomy, XRT in 40's    Problem List:  2023-12: Preventative health care  2023-10: Thalamic stroke (CMD)  2023-10: Impaired mobility and ADLs  2023-10: History of CVA (cerebrovascular accident)  2023-10: Alcohol abuse  2023-10: EtOH dependence (CMD)  2023-10: On deep vein thrombosis (DVT) prophylaxis  2023-10: Right sided weakness  2023-10: Fall from ground level  2023-10: Primary hypertension  2023-10: Uncontrolled type 2 diabetes mellitus with hyperglycemia   (CMD)  2022-08: Current moderate episode of major depressive disorder   without prior episode (CMD)  2022-02: Lumbar disc disease  2021-09: History of breast cancer  2020-02: Bilateral impacted cerumen  2018-12: Viral upper respiratory tract infection  2018-10: Sciatica of right side  2017-05: Type 2 diabetes mellitus without complication (CMD)  2017-05: Osteopenia  2017-05: Obesity  2017-05: Hyperlipidemia  2017-05: Depression  2017-05: GERD (gastroesophageal reflux disease)  2017-05: Cataracts, bilateral      Past surgical History    CHOLECYSTECTOMY                                               TOTAL ABDOMINAL HYSTERECTOMY                                  TOTAL HIP REPLACEMENT                                           Comment: right and left    LAMINECTOMY                                                   TOTAL KNEE REPLACEMENT                                        FOOT SURGERY                                                    Comment: hammertoe repair    CATARACT EXTRACTION, BILATERAL                                Family History  family history includes Diabetes in her brother, brother, maternal grandmother, mother, and another family member.    Social  History   reports that she has never smoked. She has never used smokeless tobacco. She reports current alcohol use. She reports that she does not use drugs.  Work History:Retired.     General Examination  BP (!) 140/79 (BP Location: LUE - Left upper extremity, Patient Position: Sitting, Cuff Size: Regular)   Pulse 81   Ht 5' 3\" (1.6 m)   Wt 70.9 kg (156 lb 4.9 oz)   LMP  (LMP Unknown)   BMI 27.69 kg/m²   BSA 1.74 m²    HEENT: The head is atraumatic and normocephalic.    NECK: The neck is supple and non-tender.  SKIN: Normal  EXTREMITIES: Normal    Neurological Examination  MENTAL STATUS: The patient is alert and oriented times three.  There is normal speech and language function.  Short-term and long-term memory, fund of knowledge, mood are normal  Normal attention span and judgment.  CRANIAL NERVES: The pupils are equal, round and reactive to light and accommodation.  The visual fields were intact to confrontation.  The ocular ductions were full.  There was no evidence for gaze-evoked nystagmus.  The facial sensations were intact bilaterally.  There was no evidence for facial asymmetry. Hearing was normal bilaterally and the tongue and palate were in the midline.  Sternocleidomastoids and upper trapezius strength were normal.  MOTOR: Muscle tone examination showed normal tone and bulk within the upper and lower extremities.  Muscle strength testing revealed 5/5 power within the upper extremities and left lower extremities.    The right HF: 4+/5, KE: 5-/5, PF: 4+/5. DF: 5-/5  Deep tendon reflexes were  2/4 throughout upper extremities and bilateral ankle jerks.  The plantar reflex was flexor bilaterally.   SENSORY: Normal light touch, reduced pin prick in a stocking distribution to the level of the mid legs bilaterally. Vibratory sensation decreased in distal to proximal gradient, appreciated at the lateral malleoli for 4-5 seconds.   COORDINATION: There was no evidence for postural or action tremor.  There  was no dysmetria seen on finger-nose-finger  GAIT: The casual gait demonstrates circumduction gait on the right.                                                 MEDICAL DECISION MAKING     Data Reviewed  [facility] Record reviewed    Labs  CBC  Lab Results   Component Value Date    WBC 6.1 11/07/2023    RBC 4.84 11/07/2023    HCT 44.7 11/07/2023    HGB 14.6 11/07/2023     11/07/2023       CMP  Lab Results   Component Value Date    GLUCOSE 89 11/07/2023    BUN 22 (H) 11/07/2023    CREATININE 0.57 11/07/2023    CALCIUM 9.0 11/07/2023    BILIRUBIN 0.9 11/07/2023    GPT 57 11/07/2023    AST 31 11/07/2023    ALKPT 84 11/07/2023    PT 10.5 10/26/2023    ALBUMIN 3.7 11/07/2023    SODIUM 139 11/07/2023    POTASSIUM 4.1 11/07/2023    CHLORIDE 108 11/07/2023    CO2 26 11/07/2023       Admission on 10/31/2023, Discharged on 11/09/2023   Component Date Value Ref Range Status    GLUCOSE, BEDSIDE - POINT OF CARE 10/31/2023 163 (H)  70 - 99 mg/dL Final    GLUCOSE, BEDSIDE - POINT OF CARE 10/31/2023 229 (H)  70 - 99 mg/dL Final    GLUCOSE, BEDSIDE - POINT OF CARE 11/01/2023 184 (H)  70 - 99 mg/dL Final    GLUCOSE, BEDSIDE - POINT OF CARE 11/01/2023 335 (H)  70 - 99 mg/dL Final    GLUCOSE, BEDSIDE - POINT OF CARE 11/01/2023 239 (H)  70 - 99 mg/dL Final    GLUCOSE, BEDSIDE - POINT OF CARE 11/01/2023 110 (H)  70 - 99 mg/dL Final    GLUCOSE, BEDSIDE - POINT OF CARE 11/01/2023 211 (H)  70 - 99 mg/dL Final    Sodium 11/02/2023 139  135 - 145 mmol/L Final    Potassium 11/02/2023 3.9  3.4 - 5.1 mmol/L Final    Chloride 11/02/2023 106  97 - 110 mmol/L Final    Carbon Dioxide 11/02/2023 27  21 - 32 mmol/L Final    Anion Gap 11/02/2023 10  7 - 19 mmol/L Final    Glucose 11/02/2023 187 (H)  70 - 99 mg/dL Final    BUN 11/02/2023 24 (H)  6 - 20 mg/dL Final    Creatinine 11/02/2023 0.54  0.51 - 0.95 mg/dL Final    Glomerular Filtration Rate 11/02/2023 >90  >=60 Final    BUN/Cr 11/02/2023 44 (H)  7 - 25 Final    Calcium  11/02/2023 8.6  8.4 - 10.2 mg/dL Final    WBC 11/02/2023 6.0  4.2 - 11.0 K/mcL Final    RBC 11/02/2023 4.56  4.00 - 5.20 mil/mcL Final    HGB 11/02/2023 13.7  12.0 - 15.5 g/dL Final    HCT 11/02/2023 41.6  36.0 - 46.5 % Final    MCV 11/02/2023 91.2  78.0 - 100.0 fl Final    MCH 11/02/2023 30.0  26.0 - 34.0 pg Final    MCHC 11/02/2023 32.9  32.0 - 36.5 g/dL Final    RDW-CV 11/02/2023 12.8  11.0 - 15.0 % Final    RDW-SD 11/02/2023 42.5  39.0 - 50.0 fL Final    PLT 11/02/2023 238  140 - 450 K/mcL Final    NRBC 11/02/2023 0  <=0 /100 WBC Final    Neutrophil, Percent 11/02/2023 47  % Final    Lymphocytes, Percent 11/02/2023 36  % Final    Mono, Percent 11/02/2023 10  % Final    Eosinophils, Percent 11/02/2023 6  % Final    Basophils, Percent 11/02/2023 1  % Final    Immature Granulocytes 11/02/2023 0  % Final    Absolute Neutrophils 11/02/2023 2.8  1.8 - 7.7 K/mcL Final    Absolute Lymphocytes 11/02/2023 2.1  1.0 - 4.0 K/mcL Final    Absolute Monocytes 11/02/2023 0.6  0.3 - 0.9 K/mcL Final    Absolute Eosinophils  11/02/2023 0.4  0.0 - 0.5 K/mcL Final    Absolute Basophils 11/02/2023 0.1  0.0 - 0.3 K/mcL Final    Absolute Immature Granulocytes 11/02/2023 0.0  0.0 - 0.2 K/mcL Final    GLUCOSE, BEDSIDE - POINT OF CARE 11/02/2023 176 (H)  70 - 99 mg/dL Final    GLUCOSE, BEDSIDE - POINT OF CARE 11/02/2023 236 (H)  70 - 99 mg/dL Final    GLUCOSE, BEDSIDE - POINT OF CARE 11/02/2023 131 (H)  70 - 99 mg/dL Final    GLUCOSE, BEDSIDE - POINT OF CARE 11/02/2023 203 (H)  70 - 99 mg/dL Final    GLUCOSE, BEDSIDE - POINT OF CARE 11/03/2023 196 (H)  70 - 99 mg/dL Final    GLUCOSE, BEDSIDE - POINT OF CARE 11/03/2023 227 (H)  70 - 99 mg/dL Final    GLUCOSE, BEDSIDE - POINT OF CARE 11/03/2023 85  70 - 99 mg/dL Final    GLUCOSE, BEDSIDE - POINT OF CARE 11/03/2023 181 (H)  70 - 99 mg/dL Final    GLUCOSE, BEDSIDE - POINT OF CARE 11/04/2023 142 (H)  70 - 99 mg/dL Final    GLUCOSE, BEDSIDE - POINT OF CARE 11/04/2023 172 (H)  70 - 99 mg/dL Final     GLUCOSE, BEDSIDE - POINT OF CARE 11/04/2023 83  70 - 99 mg/dL Final    GLUCOSE, BEDSIDE - POINT OF CARE 11/04/2023 115 (H)  70 - 99 mg/dL Final    GLUCOSE, BEDSIDE - POINT OF CARE 11/05/2023 123 (H)  70 - 99 mg/dL Final    GLUCOSE, BEDSIDE - POINT OF CARE 11/05/2023 174 (H)  70 - 99 mg/dL Final    GLUCOSE, BEDSIDE - POINT OF CARE 11/05/2023 156 (H)  70 - 99 mg/dL Final    GLUCOSE, BEDSIDE - POINT OF CARE 11/05/2023 148 (H)  70 - 99 mg/dL Final    GLUCOSE, BEDSIDE - POINT OF CARE 11/06/2023 132 (H)  70 - 99 mg/dL Final    GLUCOSE, BEDSIDE - POINT OF CARE 11/06/2023 188 (H)  70 - 99 mg/dL Final    GLUCOSE, BEDSIDE - POINT OF CARE 11/06/2023 206 (H)  70 - 99 mg/dL Final    GLUCOSE, BEDSIDE - POINT OF CARE 11/06/2023 187 (H)  70 - 99 mg/dL Final    Sodium 11/07/2023 139  135 - 145 mmol/L Final    Potassium 11/07/2023 4.1  3.4 - 5.1 mmol/L Final    Chloride 11/07/2023 108  97 - 110 mmol/L Final    Carbon Dioxide 11/07/2023 26  21 - 32 mmol/L Final    Anion Gap 11/07/2023 9  7 - 19 mmol/L Final    Glucose 11/07/2023 89  70 - 99 mg/dL Final    BUN 11/07/2023 22 (H)  6 - 20 mg/dL Final    Creatinine 11/07/2023 0.57  0.51 - 0.95 mg/dL Final    Glomerular Filtration Rate 11/07/2023 >90  >=60 Final    BUN/Cr 11/07/2023 39 (H)  7 - 25 Final    Calcium 11/07/2023 9.0  8.4 - 10.2 mg/dL Final    Bilirubin, Total 11/07/2023 0.9  0.2 - 1.0 mg/dL Final    GOT/AST 11/07/2023 31  <=37 Units/L Final    GPT/ALT 11/07/2023 57  <64 Units/L Final    Alkaline Phosphatase 11/07/2023 84  45 - 117 Units/L Final    Albumin 11/07/2023 3.7  3.6 - 5.1 g/dL Final    Protein, Total 11/07/2023 7.2  6.4 - 8.2 g/dL Final    Globulin 11/07/2023 3.5  2.0 - 4.0 g/dL Final    A/G Ratio 11/07/2023 1.1  1.0 - 2.4 Final    WBC 11/07/2023 6.1  4.2 - 11.0 K/mcL Final    RBC 11/07/2023 4.84  4.00 - 5.20 mil/mcL Final    HGB 11/07/2023 14.6  12.0 - 15.5 g/dL Final    HCT 11/07/2023 44.7  36.0 - 46.5 % Final    MCV 11/07/2023 92.4  78.0 - 100.0 fl Final    MCH  11/07/2023 30.2  26.0 - 34.0 pg Final    MCHC 11/07/2023 32.7  32.0 - 36.5 g/dL Final    RDW-CV 11/07/2023 12.7  11.0 - 15.0 % Final    RDW-SD 11/07/2023 43.2  39.0 - 50.0 fL Final    PLT 11/07/2023 293  140 - 450 K/mcL Final    NRBC 11/07/2023 0  <=0 /100 WBC Final    Neutrophil, Percent 11/07/2023 52  % Final    Lymphocytes, Percent 11/07/2023 33  % Final    Mono, Percent 11/07/2023 8  % Final    Eosinophils, Percent 11/07/2023 6  % Final    Basophils, Percent 11/07/2023 1  % Final    Immature Granulocytes 11/07/2023 0  % Final    Absolute Neutrophils 11/07/2023 3.1  1.8 - 7.7 K/mcL Final    Absolute Lymphocytes 11/07/2023 2.0  1.0 - 4.0 K/mcL Final    Absolute Monocytes 11/07/2023 0.5  0.3 - 0.9 K/mcL Final    Absolute Eosinophils  11/07/2023 0.4  0.0 - 0.5 K/mcL Final    Absolute Basophils 11/07/2023 0.1  0.0 - 0.3 K/mcL Final    Absolute Immature Granulocytes 11/07/2023 0.0  0.0 - 0.2 K/mcL Final    GLUCOSE, BEDSIDE - POINT OF CARE 11/07/2023 113 (H)  70 - 99 mg/dL Final    GLUCOSE, BEDSIDE - POINT OF CARE 11/07/2023 191 (H)  70 - 99 mg/dL Final    GLUCOSE, BEDSIDE - POINT OF CARE 11/07/2023 109 (H)  70 - 99 mg/dL Final    GLUCOSE, BEDSIDE - POINT OF CARE 11/07/2023 123 (H)  70 - 99 mg/dL Final    GLUCOSE, BEDSIDE - POINT OF CARE 11/08/2023 136 (H)  70 - 99 mg/dL Final    GLUCOSE, BEDSIDE - POINT OF CARE 11/08/2023 195 (H)  70 - 99 mg/dL Final    GLUCOSE, BEDSIDE - POINT OF CARE 11/08/2023 178 (H)  70 - 99 mg/dL Final    GLUCOSE, BEDSIDE - POINT OF CARE 11/08/2023 96  70 - 99 mg/dL Final    GLUCOSE, BEDSIDE - POINT OF CARE 11/09/2023 180 (H)  70 - 99 mg/dL Final    GLUCOSE, BEDSIDE - POINT OF CARE 11/09/2023 269 (H)  70 - 99 mg/dL Final   Admission on 10/26/2023, Discharged on 10/31/2023   Component Date Value Ref Range Status    Ventricular Rate EKG/Min (BPM) 10/26/2023 94   Final    Atrial Rate (BPM) 10/26/2023 94   Final    WI-Interval (MSEC) 10/26/2023 193   Final    QRS-Interval (MSEC) 10/26/2023 102    Final    QT-Interval (MSEC) 10/26/2023 377   Final    QTc 10/26/2023 472   Final    P Axis (Degrees) 10/26/2023 24   Final    R Axis (Degrees) 10/26/2023 -36   Final    T Axis (Degrees) 10/26/2023 73   Final    REPORT TEXT 10/26/2023    Final                    Value:Sinus rhythm  Left axis deviation  probable Anteroseptal infarct, old  Confirmed by CAREY BAUTISTA MD ([id number]) on 10/26/2023 8:16:05 PM      Sodium 10/26/2023 140  135 - 145 mmol/L Final    Potassium 10/26/2023 4.4  3.4 - 5.1 mmol/L Final    Chloride 10/26/2023 107  97 - 110 mmol/L Final    Carbon Dioxide 10/26/2023 33 (H)  21 - 32 mmol/L Final    Anion Gap 10/26/2023 4 (L)  7 - 19 mmol/L Final    Glucose 10/26/2023 152 (H)  70 - 99 mg/dL Final    BUN 10/26/2023 10  6 - 20 mg/dL Final    Creatinine 10/26/2023 0.65  0.51 - 0.95 mg/dL Final    Glomerular Filtration Rate 10/26/2023 >90  >=60 Final    BUN/Cr 10/26/2023 15  7 - 25 Final    Calcium 10/26/2023 9.2  8.4 - 10.2 mg/dL Final    Bilirubin, Total 10/26/2023 0.3  0.2 - 1.0 mg/dL Final    GOT/AST 10/26/2023 12  <=37 Units/L Final    GPT/ALT 10/26/2023 16  <64 Units/L Final    Alkaline Phosphatase 10/26/2023 72  45 - 117 Units/L Final    Albumin 10/26/2023 3.6  3.6 - 5.1 g/dL Final    Protein, Total 10/26/2023 6.9  6.4 - 8.2 g/dL Final    Globulin 10/26/2023 3.3  2.0 - 4.0 g/dL Final    A/G Ratio 10/26/2023 1.1  1.0 - 2.4 Final    Hemoglobin A1C 10/26/2023 10.8 (H)  4.5 - 5.6 % Final    Protime- PT 10/26/2023 10.5  9.7 - 11.8 sec Final    INR 10/26/2023 1.0    Final    PTT 10/26/2023 41 (H)  22 - 32 sec Final    Troponin I, High Sensitivity 10/26/2023 11  <52 ng/L Final    WBC 10/26/2023 5.4  4.2 - 11.0 K/mcL Final    RBC 10/26/2023 4.58  4.00 - 5.20 mil/mcL Final    HGB 10/26/2023 14.0  12.0 - 15.5 g/dL Final    HCT 10/26/2023 42.6  36.0 - 46.5 % Final    MCV 10/26/2023 93.0  78.0 - 100.0 fl Final    MCH 10/26/2023 30.6  26.0 - 34.0 pg Final    MCHC 10/26/2023 32.9  32.0 - 36.5 g/dL Final    RDW-CV  10/26/2023 13.0  11.0 - 15.0 % Final    RDW-SD 10/26/2023 44.5  39.0 - 50.0 fL Final    PLT 10/26/2023 243  140 - 450 K/mcL Final    NRBC 10/26/2023 0  <=0 /100 WBC Final    Neutrophil, Percent 10/26/2023 54  % Final    Lymphocytes, Percent 10/26/2023 32  % Final    Mono, Percent 10/26/2023 9  % Final    Eosinophils, Percent 10/26/2023 4  % Final    Basophils, Percent 10/26/2023 1  % Final    Immature Granulocytes 10/26/2023 0  % Final    Absolute Neutrophils 10/26/2023 3.0  1.8 - 7.7 K/mcL Final    Absolute Lymphocytes 10/26/2023 1.7  1.0 - 4.0 K/mcL Final    Absolute Monocytes 10/26/2023 0.5  0.3 - 0.9 K/mcL Final    Absolute Eosinophils  10/26/2023 0.2  0.0 - 0.5 K/mcL Final    Absolute Basophils 10/26/2023 0.1  0.0 - 0.3 K/mcL Final    Absolute Immature Granulocytes 10/26/2023 0.0  0.0 - 0.2 K/mcL Final    GLUCOSE, BEDSIDE - POINT OF CARE 10/26/2023 144 (H)  70 - 99 mg/dL Final    AV Stenosis Severity Text 10/27/2023 Absent   Corrected    Aortic Valve Area 10/27/2023 1.65   Corrected    AV Peak Gradient 10/27/2023 13.00   Corrected    AV Mean Gradient 10/27/2023 8.00   Corrected    AV Peak Velocity 10/27/2023 1.79   Corrected    AV Mean Velocity 10/27/2023 1.34   Corrected    Ejection Fraction 10/27/2023 66%   Corrected    AV VTI (Previously displayed as AV* 10/27/2023 0.80   Corrected    MV Peak E Velocity 10/27/2023 1.53   Corrected    MV Peak A Velocity 10/27/2023 151   Corrected    E Wave Decelaration Time 10/27/2023 [id number]   Corrected    MV E Wave Gregor/E Tissue Gregor Med 10/27/2023 4.13   Corrected    MV E Tissue Gregor Med 10/27/2023 6.2   Corrected    Ascending Aorta 10/27/2023 3   Corrected    TV Estimated Right Arterial Pressu* 10/27/2023 12.7   Corrected    RV End Systolic Longitudinal Strai* 10/27/2023 2   Corrected    LV outflow tract 10/27/2023 18.8   Corrected    LVOT VTI 10/27/2023 1.00   Corrected    MARQUES LVOT Peak Gradient 10/27/2023 1.2   Corrected    LV end diastolic posterior wall th*  10/27/2023 4.3   Corrected    Left Ventricular Internal Dimensio* 10/27/2023 2.7   Corrected    Left Internal Dimenson in Systole 10/27/2023 1.2   Corrected    Interventricular Septum in End Arleth* 10/27/2023 1.95   Corrected    Cholesterol 10/27/2023 168  <=199 mg/dL Final    Triglycerides 10/27/2023 70  <=149 mg/dL Final    HDL 10/27/2023 75  >=50 mg/dL Final    LDL 10/27/2023 79  <=129 mg/dL Final    Non-HDL Cholesterol 10/27/2023 93  mg/dL Final    Cholesterol/ HDL Ratio 10/27/2023 2.2  <=4.4 Final    Magnesium 10/27/2023 2.1  1.7 - 2.4 mg/dL Final    Sodium 10/27/2023 141  135 - 145 mmol/L Final    Potassium 10/27/2023 4.6  3.4 - 5.1 mmol/L Final    Chloride 10/27/2023 109  97 - 110 mmol/L Final    Carbon Dioxide 10/27/2023 29  21 - 32 mmol/L Final    Anion Gap 10/27/2023 8  7 - 19 mmol/L Final    Glucose 10/27/2023 111 (H)  70 - 99 mg/dL Final    BUN 10/27/2023 10  6 - 20 mg/dL Final    Creatinine 10/27/2023 0.56  0.51 - 0.95 mg/dL Final    Glomerular Filtration Rate 10/27/2023 >90  >=60 Final    BUN/Cr 10/27/2023 18  7 - 25 Final    Calcium 10/27/2023 9.1  8.4 - 10.2 mg/dL Final    Bilirubin, Total 10/27/2023 0.6  0.2 - 1.0 mg/dL Final    GOT/AST 10/27/2023 10  <=37 Units/L Final    GPT/ALT 10/27/2023 15  <64 Units/L Final    Alkaline Phosphatase 10/27/2023 69  45 - 117 Units/L Final    Albumin 10/27/2023 3.3 (L)  3.6 - 5.1 g/dL Final    Protein, Total 10/27/2023 6.5  6.4 - 8.2 g/dL Final    Globulin 10/27/2023 3.2  2.0 - 4.0 g/dL Final    A/G Ratio 10/27/2023 1.0  1.0 - 2.4 Final    WBC 10/27/2023 4.8  4.2 - 11.0 K/mcL Final    RBC 10/27/2023 4.64  4.00 - 5.20 mil/mcL Final    HGB 10/27/2023 13.9  12.0 - 15.5 g/dL Final    HCT 10/27/2023 42.9  36.0 - 46.5 % Final    MCV 10/27/2023 92.5  78.0 - 100.0 fl Final    MCH 10/27/2023 30.0  26.0 - 34.0 pg Final    MCHC 10/27/2023 32.4  32.0 - 36.5 g/dL Final    PLT 10/27/2023 225  140 - 450 K/mcL Final    RDW-CV 10/27/2023 13.0  11.0 - 15.0 % Final    RDW-SD 10/27/2023  44.5  39.0 - 50.0 fL Final    NRBC 10/27/2023 0  <=0 /100 WBC Final    GLUCOSE, BEDSIDE - POINT OF CARE 10/26/2023 161 (H)  70 - 99 mg/dL Final    GLUCOSE, BEDSIDE - POINT OF CARE 10/27/2023 75  70 - 99 mg/dL Final    GLUCOSE, BEDSIDE - POINT OF CARE 10/27/2023 81  70 - 99 mg/dL Final    GLUCOSE, BEDSIDE - POINT OF CARE 10/27/2023 210 (H)  70 - 99 mg/dL Final    GLUCOSE, BEDSIDE - POINT OF CARE 10/27/2023 107 (H)  70 - 99 mg/dL Final    GLUCOSE, BEDSIDE - POINT OF CARE 10/27/2023 152 (H)  70 - 99 mg/dL Final    GLUCOSE, BEDSIDE - POINT OF CARE 10/28/2023 159 (H)  70 - 99 mg/dL Final    GLUCOSE, BEDSIDE - POINT OF CARE 10/28/2023 289 (H)  70 - 99 mg/dL Final    GLUCOSE, BEDSIDE - POINT OF CARE 10/28/2023 164 (H)  70 - 99 mg/dL Final    GLUCOSE, BEDSIDE - POINT OF CARE 10/28/2023 359 (H)  70 - 99 mg/dL Final    GLUCOSE, BEDSIDE - POINT OF CARE 10/29/2023 176 (H)  70 - 99 mg/dL Final    GLUCOSE, BEDSIDE - POINT OF CARE 10/29/2023 162 (H)  70 - 99 mg/dL Final    GLUCOSE, BEDSIDE - POINT OF CARE 10/29/2023 267 (H)  70 - 99 mg/dL Final    GLUCOSE, BEDSIDE - POINT OF CARE 10/29/2023 236 (H)  70 - 99 mg/dL Final    GLUCOSE, BEDSIDE - POINT OF CARE 10/29/2023 286 (H)  70 - 99 mg/dL Final    GLUCOSE, BEDSIDE - POINT OF CARE 10/30/2023 191 (H)  70 - 99 mg/dL Final    GLUCOSE, BEDSIDE - POINT OF CARE 10/30/2023 262 (H)  70 - 99 mg/dL Final    GLUCOSE, BEDSIDE - POINT OF CARE 10/30/2023 187 (H)  70 - 99 mg/dL Final    GLUCOSE, BEDSIDE - POINT OF CARE 10/30/2023 268 (H)  70 - 99 mg/dL Final    GLUCOSE, BEDSIDE - POINT OF CARE 10/31/2023 218 (H)  70 - 99 mg/dL Final    GLUCOSE, BEDSIDE - POINT OF CARE 10/31/2023 331 (H)  70 - 99 mg/dL Final    Rapid SARS-COV-2 by PCR 10/31/2023 Not Detected  Not Detected / Detected / Presumptive Positive / Inhibitors present Final    Isolation Guidelines 10/31/2023    Final    Procedural Comment 10/31/2023    Final   Office Visit on 10/18/2023   Component Date Value Ref Range Status     Microalbumin, Urine 10/18/2023 0.66  mg/dL Final    Creatinine, Urine 10/18/2023 31.50  mg/dL Final    Microalbumin/ Creatinine Ratio 10/18/2023 21.0  <30.0 mg/g Final   Admission on 10/07/2023, Discharged on 10/09/2023   Component Date Value Ref Range Status    Sodium 10/07/2023 134 (L)  135 - 145 mmol/L Final    Potassium 10/07/2023 3.8  3.4 - 5.1 mmol/L Final    Chloride 10/07/2023 105  97 - 110 mmol/L Final    Carbon Dioxide 10/07/2023 19 (L)  21 - 32 mmol/L Final    Anion Gap 10/07/2023 14  7 - 19 mmol/L Final    Glucose 10/07/2023 548 (HH)  70 - 99 mg/dL Final    BUN 10/07/2023 16  6 - 20 mg/dL Final    Creatinine 10/07/2023 0.63  0.51 - 0.95 mg/dL Final    Glomerular Filtration Rate 10/07/2023 >90  >=60 Final    BUN/Cr 10/07/2023 25  7 - 25 Final    Calcium 10/07/2023 8.4  8.4 - 10.2 mg/dL Final    Bilirubin, Total 10/07/2023 0.4  0.2 - 1.0 mg/dL Final    GOT/AST 10/07/2023 20  <=37 Units/L Final    GPT/ALT 10/07/2023 17  <64 Units/L Final    Alkaline Phosphatase 10/07/2023 86  45 - 117 Units/L Final    Albumin 10/07/2023 3.3 (L)  3.6 - 5.1 g/dL Final    Protein, Total 10/07/2023 6.5  6.4 - 8.2 g/dL Final    Globulin 10/07/2023 3.2  2.0 - 4.0 g/dL Final    A/G Ratio 10/07/2023 1.0  1.0 - 2.4 Final    Ventricular Rate EKG/Min (BPM) 10/07/2023 95   Final    Atrial Rate (BPM) 10/07/2023 96   Final    WV-Interval (MSEC) 10/07/2023 165   Final    QRS-Interval (MSEC) 10/07/2023 117   Final    QT-Interval (MSEC) 10/07/2023 401   Final    QTc 10/07/2023 504   Final    P Axis (Degrees) 10/07/2023 38   Final    R Axis (Degrees) 10/07/2023 -33   Final    T Axis (Degrees) 10/07/2023 109   Final    REPORT TEXT 10/07/2023    Final                    Value:Sinus rhythm  Atrial premature complex  Probable left atrial enlargement  LVH with secondary repolarization abnormality  Anterior Q waves, possibly due to LVH  Nonspecific intraventricular conduction delay  Prolonged QT interval  Confirmed by TANIA CORNEJO, TU ([id number])  on 10/9/2023 9:27:54 AM      WBC 10/07/2023 6.7  4.2 - 11.0 K/mcL Final    RBC 10/07/2023 4.75  4.00 - 5.20 mil/mcL Final    HGB 10/07/2023 14.4  12.0 - 15.5 g/dL Final    HCT 10/07/2023 43.7  36.0 - 46.5 % Final    MCV 10/07/2023 92.0  78.0 - 100.0 fl Final    MCH 10/07/2023 30.3  26.0 - 34.0 pg Final    MCHC 10/07/2023 33.0  32.0 - 36.5 g/dL Final    RDW-CV 10/07/2023 13.3  11.0 - 15.0 % Final    RDW-SD 10/07/2023 45.5  39.0 - 50.0 fL Final    PLT 10/07/2023 245  140 - 450 K/mcL Final    NRBC 10/07/2023 0  <=0 /100 WBC Final    Neutrophil, Percent 10/07/2023 55  % Final    Lymphocytes, Percent 10/07/2023 31  % Final    Mono, Percent 10/07/2023 9  % Final    Eosinophils, Percent 10/07/2023 3  % Final    Basophils, Percent 10/07/2023 1  % Final    Immature Granulocytes 10/07/2023 1  % Final    Absolute Neutrophils 10/07/2023 3.7  1.8 - 7.7 K/mcL Final    Absolute Lymphocytes 10/07/2023 2.1  1.0 - 4.0 K/mcL Final    Absolute Monocytes 10/07/2023 0.6  0.3 - 0.9 K/mcL Final    Absolute Eosinophils  10/07/2023 0.2  0.0 - 0.5 K/mcL Final    Absolute Basophils 10/07/2023 0.1  0.0 - 0.3 K/mcL Final    Absolute Immature Granulocytes 10/07/2023 0.0  0.0 - 0.2 K/mcL Final    GLUCOSE, BEDSIDE - POINT OF CARE 10/07/2023 500 (HH)  70 - 99 mg/dL Final    Beta Hydroxybutyrate 10/07/2023 0.4 (H)  0.0 - 0.3 mmol/L Final    Alcohol 10/07/2023 188 (H)  <3 mg/dL Final    pH, Venous 10/07/2023 7.29 (L)  7.35 - 7.45 Units Final    pCO2, Venous 10/07/2023 40  38 - 51 mm Hg Final    pO2, Venous 10/07/2023 54 (H)  35 - 42 mm Hg Final    HCO3, Venous 10/07/2023 19 (L)  22 - 28 mmol/L Final    Base Excess/ Deficit, Venous 10/07/2023 -7 (L)  -2 - 2 mmol/L Final    O2 Saturation, Venous 10/07/2023 82 (H)  60 - 80 % Final    Oxyhemoglobin, Venous 10/07/2023 80  60 - 80 % Final    Hemoglobin, Blood Gas 10/07/2023 13.9  12.0 - 15.5 g/dL Final    Potassium 10/07/2023 3.8  3.4 - 5.1 mmol/L Final    GLUCOSE, BEDSIDE - POINT OF CARE 10/07/2023 466  (HH)  70 - 99 mg/dL Final    GLUCOSE, BEDSIDE - POINT OF CARE 10/07/2023 378 (H)  70 - 99 mg/dL Final    Osmolality 10/07/2023 364 (H)  275 - 300 mOsm/kg Final    GLUCOSE, BEDSIDE - POINT OF CARE 10/07/2023 382 (H)  70 - 99 mg/dL Final    TSH 10/08/2023 0.958  0.350 - 5.000 mcUnits/mL Final    Vitamin B12 10/08/2023 744  211 - 911 pg/mL Final    Folate 10/08/2023 10.0  >=5.5 ng/mL Final    Sodium 10/08/2023 138  135 - 145 mmol/L Final    Potassium 10/08/2023 3.6  3.4 - 5.1 mmol/L Final    Chloride 10/08/2023 105  97 - 110 mmol/L Final    Carbon Dioxide 10/08/2023 25  21 - 32 mmol/L Final    Anion Gap 10/08/2023 12  7 - 19 mmol/L Final    Glucose 10/08/2023 308 (H)  70 - 99 mg/dL Final    BUN 10/08/2023 10  6 - 20 mg/dL Final    Creatinine 10/08/2023 0.51  0.51 - 0.95 mg/dL Final    Glomerular Filtration Rate 10/08/2023 >90  >=60 Final    BUN/Cr 10/08/2023 20  7 - 25 Final    Calcium 10/08/2023 8.8  8.4 - 10.2 mg/dL Final    Bilirubin, Total 10/08/2023 0.5  0.2 - 1.0 mg/dL Final    GOT/AST 10/08/2023 9  <=37 Units/L Final    GPT/ALT 10/08/2023 17  <64 Units/L Final    Alkaline Phosphatase 10/08/2023 94  45 - 117 Units/L Final    Albumin 10/08/2023 3.4 (L)  3.6 - 5.1 g/dL Final    Protein, Total 10/08/2023 6.9  6.4 - 8.2 g/dL Final    Globulin 10/08/2023 3.5  2.0 - 4.0 g/dL Final    A/G Ratio 10/08/2023 1.0  1.0 - 2.4 Final    WBC 10/08/2023 6.8  4.2 - 11.0 K/mcL Final    RBC 10/08/2023 4.96  4.00 - 5.20 mil/mcL Final    HGB 10/08/2023 15.2  12.0 - 15.5 g/dL Final    HCT 10/08/2023 44.5  36.0 - 46.5 % Final    MCV 10/08/2023 89.7  78.0 - 100.0 fl Final    MCH 10/08/2023 30.6  26.0 - 34.0 pg Final    MCHC 10/08/2023 34.2  32.0 - 36.5 g/dL Final    RDW-CV 10/08/2023 13.3  11.0 - 15.0 % Final    RDW-SD 10/08/2023 43.5  39.0 - 50.0 fL Final    PLT 10/08/2023 238  140 - 450 K/mcL Final    NRBC 10/08/2023 0  <=0 /100 WBC Final    Neutrophil, Percent 10/08/2023 60  % Final    Lymphocytes, Percent 10/08/2023 28  % Final     Mono, Percent 10/08/2023 8  % Final    Eosinophils, Percent 10/08/2023 3  % Final    Basophils, Percent 10/08/2023 1  % Final    Immature Granulocytes 10/08/2023 0  % Final    Absolute Neutrophils 10/08/2023 4.1  1.8 - 7.7 K/mcL Final    Absolute Lymphocytes 10/08/2023 1.9  1.0 - 4.0 K/mcL Final    Absolute Monocytes 10/08/2023 0.5  0.3 - 0.9 K/mcL Final    Absolute Eosinophils  10/08/2023 0.2  0.0 - 0.5 K/mcL Final    Absolute Basophils 10/08/2023 0.0  0.0 - 0.3 K/mcL Final    Absolute Immature Granulocytes 10/08/2023 0.0  0.0 - 0.2 K/mcL Final    GLUCOSE, BEDSIDE - POINT OF CARE 10/08/2023 494 (HH)  70 - 99 mg/dL Final    GLUCOSE, BEDSIDE - POINT OF CARE 10/08/2023 495 (HH)  70 - 99 mg/dL Final    GLUCOSE, BEDSIDE - POINT OF CARE 10/08/2023 296 (H)  70 - 99 mg/dL Final    GLUCOSE, BEDSIDE - POINT OF CARE 10/08/2023 266 (H)  70 - 99 mg/dL Final    GLUCOSE, BEDSIDE - POINT OF CARE 10/08/2023 251 (H)  70 - 99 mg/dL Final    GLUCOSE, BEDSIDE - POINT OF CARE 10/08/2023 227 (H)  70 - 99 mg/dL Final    Sodium 10/09/2023 135  135 - 145 mmol/L Final    Potassium 10/09/2023 4.6  3.4 - 5.1 mmol/L Final    Chloride 10/09/2023 103  97 - 110 mmol/L Final    Carbon Dioxide 10/09/2023 26  21 - 32 mmol/L Final    Anion Gap 10/09/2023 11  7 - 19 mmol/L Final    Glucose 10/09/2023 274 (H)  70 - 99 mg/dL Final    BUN 10/09/2023 14  6 - 20 mg/dL Final    Creatinine 10/09/2023 0.57  0.51 - 0.95 mg/dL Final    Glomerular Filtration Rate 10/09/2023 >90  >=60 Final    BUN/Cr 10/09/2023 25  7 - 25 Final    Calcium 10/09/2023 8.8  8.4 - 10.2 mg/dL Final    Bilirubin, Total 10/09/2023 0.7  0.2 - 1.0 mg/dL Final    GOT/AST 10/09/2023 16  <=37 Units/L Final    GPT/ALT 10/09/2023 15  <64 Units/L Final    Alkaline Phosphatase 10/09/2023 89  45 - 117 Units/L Final    Albumin 10/09/2023 3.2 (L)  3.6 - 5.1 g/dL Final    Protein, Total 10/09/2023 6.8  6.4 - 8.2 g/dL Final    Globulin 10/09/2023 3.6  2.0 - 4.0 g/dL Final    A/G Ratio 10/09/2023 0.9  (L)  1.0 - 2.4 Final    WBC 10/09/2023 6.4  4.2 - 11.0 K/mcL Final    RBC 10/09/2023 5.23 (H)  4.00 - 5.20 mil/mcL Final    HGB 10/09/2023 15.9 (H)  12.0 - 15.5 g/dL Final    HCT 10/09/2023 47.5 (H)  36.0 - 46.5 % Final    MCV 10/09/2023 90.8  78.0 - 100.0 fl Final    MCH 10/09/2023 30.4  26.0 - 34.0 pg Final    MCHC 10/09/2023 33.5  32.0 - 36.5 g/dL Final    RDW-CV 10/09/2023 13.1  11.0 - 15.0 % Final    RDW-SD 10/09/2023 43.5  39.0 - 50.0 fL Final    PLT 10/09/2023 227  140 - 450 K/mcL Final    NRBC 10/09/2023 0  <=0 /100 WBC Final    Neutrophil, Percent 10/09/2023 65  % Final    Lymphocytes, Percent 10/09/2023 24  % Final    Mono, Percent 10/09/2023 6  % Final    Eosinophils, Percent 10/09/2023 4  % Final    Basophils, Percent 10/09/2023 1  % Final    Immature Granulocytes 10/09/2023 0  % Final    Absolute Neutrophils 10/09/2023 4.1  1.8 - 7.7 K/mcL Final    Absolute Lymphocytes 10/09/2023 1.5  1.0 - 4.0 K/mcL Final    Absolute Monocytes 10/09/2023 0.4  0.3 - 0.9 K/mcL Final    Absolute Eosinophils  10/09/2023 0.3  0.0 - 0.5 K/mcL Final    Absolute Basophils 10/09/2023 0.1  0.0 - 0.3 K/mcL Final    Absolute Immature Granulocytes 10/09/2023 0.0  0.0 - 0.2 K/mcL Final    GLUCOSE, BEDSIDE - POINT OF CARE 10/09/2023 259 (H)  70 - 99 mg/dL Final    GLUCOSE, BEDSIDE - POINT OF CARE 10/09/2023 285 (H)  70 - 99 mg/dL Final   Lab Services on 08/30/2023   Component Date Value Ref Range Status    Cholesterol 08/30/2023 236 (H)  <=199 mg/dL Final    Triglycerides 08/30/2023 122  <=149 mg/dL Final    HDL 08/30/2023 67  >=50 mg/dL Final    LDL 08/30/2023 145 (H)  <=129 mg/dL Final    Non-HDL Cholesterol 08/30/2023 169  mg/dL Final    Cholesterol/ HDL Ratio 08/30/2023 3.5  <=4.4 Final    Hemoglobin A1C 08/30/2023 11.6 (H)  4.5 - 5.6 % Final    Fasting Status 08/30/2023 12  0 - 999 Hours Final    Sodium 08/30/2023 137  135 - 145 mmol/L Final    Potassium 08/30/2023 4.5  3.4 - 5.1 mmol/L Final    Chloride 08/30/2023 102  97 - 110  mmol/L Final    Carbon Dioxide 08/30/2023 27  21 - 32 mmol/L Final    Anion Gap 08/30/2023 13  7 - 19 mmol/L Final    Glucose 08/30/2023 245 (H)  70 - 99 mg/dL Final    BUN 08/30/2023 12  6 - 20 mg/dL Final    Creatinine 08/30/2023 0.46 (L)  0.51 - 0.95 mg/dL Final    Glomerular Filtration Rate 08/30/2023 >90  >=60 Final    BUN/Cr 08/30/2023 26 (H)  7 - 25 Final    Calcium 08/30/2023 8.4  8.4 - 10.2 mg/dL Final    Bilirubin, Total 08/30/2023 0.4  0.2 - 1.0 mg/dL Final    GOT/AST 08/30/2023 9  <=37 Units/L Final    GPT/ALT 08/30/2023 14  <64 Units/L Final    Alkaline Phosphatase 08/30/2023 93  45 - 117 Units/L Final    Albumin 08/30/2023 3.7  3.6 - 5.1 g/dL Final    Protein, Total 08/30/2023 6.5  6.4 - 8.2 g/dL Final    Globulin 08/30/2023 2.8  2.0 - 4.0 g/dL Final    A/G Ratio 08/30/2023 1.3  1.0 - 2.4 Final           Imaging/Neuroimaging    MRI BRAIN WO CONTRAST   Final Result   1. Nonhemorrhagic left thalamic infarct, as detailed above.       2. Age-appropriate cerebral and cerebellar atrophy is identified with   chronic supratentorial white matter ischemic demyelination. Additional   chronic ischemic changes are noted within the right paracentral diesi.       Electronically Signed by: TONJA ALLISON M.D.    Signed on: 10/28/2023 9:56 AM    Workstation ID: [id number]       MRI LUMBAR SPINE WO CONTRAST   Final Result   1. Multilevel disc degeneration and spondylosis with multilevel disc   bulging, as detailed above level-by-level. There are no acute   abnormalities, disc herniations, or levels of severe spinal stenosis.       2. Postoperative changes of interbody fusion at the L2/L3 level with   multilevel posterior lateral fusion.       Electronically Signed by: TONJA ALLISON M.D.    Signed on: 10/28/2023 9:59 AM    Workstation ID: [id number]       CTA HEAD AND NECK W CONTRAST LEVEL 1   Final Result   1. There is no significant stenosis or occlusion within the neck or   intracranial circulations.  Atherosclerotic changes are present within the   carotid beds bilaterally with short segment noncritical proximal left   internal carotid artery stenosis.       The results were discussed with Dr. Mcdonnell on 10/26/2023 at   approximately 7:00 p.m.       Electronically Signed by: TONJA ALLISON M.D.    Signed on: 10/26/2023 7:01 PM    Workstation ID: [id number]       CT HEAD LEVEL 1   Final Result   1. There are no acute intracranial abnormalities. Age-appropriate cerebral   and cerebellar atrophy is noted with chronic supratentorial white matter   ischemic demyelination from small vessel atherosclerotic disease.       The results were discussed with Dr. Mcdonnell on 10/26/2023 at   approximately 6:30 p.m.       Electronically Signed by: TONJA ALLISON M.D.    Signed on: 10/26/2023 6:31 PM    Workstation ID: [id number]                   TTE EF 66%, grade I diastolic dysfunction     LDL (mg/dL)   Date Value   10/27/2023 79       Your hemoglobin A1C is: Last Lab A1C:  Hemoglobin A1C (%)   Date Value   10/26/2023 10.8 (H)             Assessment/discussion/summary  Fariha Roberts is a 75 year old right handed female who presents to the neurology clinic for management of left thalamic stroke whose etiology is most consistent small vessel disease.  Neurological exam is notable for a length dependent large fiber neuropathy and pyramidal weakness of the right leg that does localize to left thalamocapsular stroke.       As stated above, the etiology of stroke is small vessel disease.      Recommend the following:        History of stroke with residual effects   Small vessel disease   -BP <130/80  -Aspirin 81 mg daily   -Cont with Lipitor 80 mg, LDL goal <70    -Glycohemoglobin <7%    Return in about 4 months (around 5/10/2024).       Discussed medication dosage, usage, goals of therapy, and side effects.  Proper usage and side effects of medications reviewed & discussed.    Risks benefits, side effects and  alternatives for this treatment is discussed  Medical compliance with plan discussed and risks of non-compliance reviewed.    Patient education completed on disease process, etiology & prognosis.    Patient expresses understanding of the plan.        Time  Total time spent during the encounter [60 min]   Previsit- Reviewed prior clinic notes, PCP notes, ER notes, Imaging and laboratory data -10 minutes  Visit- Performed medically appropriate history, obtained history from independent historian & physical examination.Surveillance labs & diagnostic studies-requested & reviewed with the patient, discussed diagnosis & prognosis, risks and benefits of treatment options, instructions for management, treatment, compliance, and follow up care. Patient and family education is provided-40 minutes.  Post visit- Document the encounter & coordinated care-10 minutes.  No LOS data to display  This includes pre-charting, chart review, and documenting.    Navid Lama MD  Diplomate to the ABPN, Neurology   Diplomate to the ABPN, Clinical Neurophysiology  Diplomate to the ABPN, Neuromuscular Medicine    [facility]  [address] - [address] [place] [address]  Phone: [phone]  Fax: [phone]      This note used Dragon technology. Transcription errors are not uncommon and may not have been corrected prior to electronically signing the note. Should you find these errors, please consult the clinician for interpretation (or apply common sense adjustment when safe and appropriate)          /

## 2024-12-20 NOTE — ED BEHAVIORAL HEALTH ASSESSMENT NOTE - INVOLUNTARY INTRAMUSCULAR MEDICATION DETAILS
30
patient given versed on initial presentation to ED and seen later by writer and found to be awake alert and oriented x3.
